# Patient Record
Sex: MALE | Race: WHITE
[De-identification: names, ages, dates, MRNs, and addresses within clinical notes are randomized per-mention and may not be internally consistent; named-entity substitution may affect disease eponyms.]

---

## 2018-03-12 ENCOUNTER — HOSPITAL ENCOUNTER (OUTPATIENT)
Dept: HOSPITAL 68 - STS | Age: 58
End: 2018-03-12
Attending: UROLOGY
Payer: COMMERCIAL

## 2018-03-12 VITALS — BODY MASS INDEX: 29.4 KG/M2 | HEIGHT: 71 IN | WEIGHT: 210 LBS

## 2018-03-12 DIAGNOSIS — N32.89: ICD-10-CM

## 2018-03-12 DIAGNOSIS — R31.29: Primary | ICD-10-CM

## 2018-03-12 DIAGNOSIS — N13.5: ICD-10-CM

## 2018-03-12 DIAGNOSIS — N13.30: ICD-10-CM

## 2018-03-12 DIAGNOSIS — N40.0: ICD-10-CM

## 2018-03-12 PROCEDURE — C2617 STENT, NON-COR, TEM W/O DEL: HCPCS

## 2018-03-12 NOTE — OPERATIVE REPORT
Operative/Inv Procedure Report
Surgery Date: 03/12/18
Name of Procedure:
Cystoscopy, left retrograde pyelogram, left ureteroscopy, left distal ureteral 
biopsy, insertion of left double-J ureteral stent
Pre-Operative Diagnosis:
Microscopic hematuria with left hydroureteronephrosis
Post-Operative Diagnosis:
Same
Estimated Blood Loss: scant
Surgeon/Assistant:
Zahra WEEKS,Jim GRANADOS
 
Anesthesia: laryngeal mask airway
Drains:
24 cm 6 [place] left double-J ureteral stent
Specimens:
Urine culture, left ureteral cytology, left ureteral biopsies
Complications:
None
Condition:
Stable
Operative Indication:
Microscopic hematuria with CT IVP showing left hydroureteronephrosis down to the
level of the distal ureter.  The ureter beyond this point was collapsed and 
nonvisualized
 
Operative/Procedure Note
Note:
The patient was taken to the cystoscopy room and identified.  He is placed in 
supine position on the cystoscopy table.  A timeout was executed appropriately 
with the patient awake.  Gen. anesthesia was induced via LMA.  He was then 
placed in the dorsolithotomy position and prepped and draped in usual fashion 
for cystoscopy.  A surgical pause was executed appropriately.  Fluoroscopy 
images taken with a marker on the left side of the abdomen to confirm the 
correct side of the surgery as well as a correct orientation of the fluoroscopy 
image.  The 22 [place] cystoscope sheath was placed into the bladder under direct
vision using the 30 lens after dilating the urethral meatus with [place] 
sounds to 24 [place].  The anterior urethra was otherwise normal.  Prostatic 
urethra showed mild hypertrophy with low-grade partial bladder outlet 
obstruction.  Upon entering the bladder cystoscopy was performed.  The bladder 
was mildly trabeculated but otherwise normal.  There was no evidence of bladder 
tumor or stone.  Ureteral orifices were normal in location and appearance.  A 5 
[place] open-ended catheter was then placed through the cystoscope into the 
bladder and into the left distal ureter.  A left retrograde pyelogram was 
performed.  The left ureter was dilated down to a point about 3 cm above the 
bladder.  At this point in the taper to a much narrow ureter.  The transition 
area was without filling defects.  At this point a guidewire was placed through 
the open-ended catheter was advanced up the level of the kidney without any 
difficulty.  The open-ended catheter was removed as was the cystoscope.  The 
safety guidewire remained in place.  The short rigid ureteroscope was then 
advanced through the urethra into the bladder into the left ureter.  The 
ureteral mucosa distal to the transition area appeared normal.  The ureteroscope
was advanced above the transition area in the ureteral mucosa appeared normal in
that area as well.  The ureter proximal to the transition area was dilated.  The
area of transition there was some minimal erythema of the ureteral mucosa.  A 
barbotage of this area was done for cytology.  Using biopsy forceps 3 biopsies 
of the area were taken.  Attempt was made to fulgurate the biopsy sites and 
there was no significant bleeding noted.  At this point the ureteroscope was 
removed.  The cystoscope was back loaded onto the guidewire.  An open-ended 
catheter was placed over the wire and the wire removed.  Some contrast was 
injected to outline the left renal collecting system.  Guidewire was placed back
through the open-ended catheter which was removed.  Under visual fluoroscopic 
control a 24 cm 6 [place]yan left double-J ureteral stent was placed.  Once the 
stent was in the correct position the guidewire was removed.  Fluoroscopy 
confirmed the proximal and the stent coiled in the left renal pelvis and the 
distal end coiled in the bladder.  A long suture was left attached the distal 
end of the stent exiting the urethra.  Patient tolerated the procedure well and 
as completion was taken recovery room in stable condition.
Findings:
Narrowed area of the left ureter about 3 cm above the bladder.  Mild proximal 
dilation of the ureter.  Normal ureteral mucosa above and below this area.  
Minimal erythema of the mucosa at the transition area no ureteral mass visual
Discharge Disposition: PACU

## 2018-03-13 NOTE — RADIOLOGY REPORT
EXAMINATION:
CR ABDOMEN/INTRAOPERATIVE FLUOROSCOPY
 
CLINICAL INDICATION:
Left ureteroscopy and laser lithotripsy with stent placement.
 
COMPARISON:
CT scan of the abdomen and pelvis dated 11/06/2017.
 
TECHNIQUE/FINDINGS:
Fluoroscopic equipment was dedicated to the operating room for the
performance of an intraoperative procedure. Several (8) spot films were
acquired and are archived in PACS. Please refer to operative notes for
procedural detail.
 
FLUOROSCOPY TIME:
0.4 minutes.
 
IMPRESSION:
Administrative dictation for intraoperative fluoroscopy and image archiving
in PACS. Please refer to operative notes for details.